# Patient Record
Sex: MALE | Race: WHITE | Employment: FULL TIME | URBAN - METROPOLITAN AREA
[De-identification: names, ages, dates, MRNs, and addresses within clinical notes are randomized per-mention and may not be internally consistent; named-entity substitution may affect disease eponyms.]

---

## 2018-07-24 ENCOUNTER — HOSPITAL ENCOUNTER (OUTPATIENT)
Dept: CT IMAGING | Age: 48
Discharge: HOME | End: 2018-07-24
Attending: INTERNAL MEDICINE | Admitting: INTERNAL MEDICINE

## 2018-07-24 DIAGNOSIS — R10.9 ABDOMINAL PAIN, UNSPECIFIED ABDOMINAL LOCATION: ICD-10-CM

## 2018-07-24 LAB
BUN BLDV-MCNC: 12 MG/DL (ref 7–20)
CREAT SERPL-MCNC: 0.6 MG/DL (ref 0.9–1.3)
GFR AFRICAN AMERICAN: >60
GFR NON-AFRICAN AMERICAN: >60

## 2018-07-26 ENCOUNTER — OFFICE VISIT (OUTPATIENT)
Dept: SURGERY | Age: 48
End: 2018-07-26

## 2018-07-26 VITALS
BODY MASS INDEX: 34.79 KG/M2 | DIASTOLIC BLOOD PRESSURE: 72 MMHG | SYSTOLIC BLOOD PRESSURE: 124 MMHG | HEIGHT: 70 IN | WEIGHT: 243 LBS

## 2018-07-26 DIAGNOSIS — M62.08 RECTUS DIASTASIS: Primary | ICD-10-CM

## 2018-07-26 PROCEDURE — 99242 OFF/OP CONSLTJ NEW/EST SF 20: CPT | Performed by: SURGERY

## 2018-07-26 ASSESSMENT — ENCOUNTER SYMPTOMS
RESPIRATORY NEGATIVE: 1
ABDOMINAL DISTENTION: 1
EYES NEGATIVE: 1

## 2018-07-26 NOTE — PROGRESS NOTES
[facility]        PATIENT NAME: Grant Salazar     TODAY'S DATE: 7/26/2018    Reason for Consult:  Hernia    Requesting Physician / PCP: Dr. Theo Culver / Dr. Rosamaria Muñiz:              The patient is a 50 y.o. male who presents with concerns of a hernia. He has had symptoms for the past weeks, has an upper abd bulge, sticks out like a tube while sitting up. No GI Sx's. Associated symptoms are swelling in the upper abd area. The patient has not had prior hernia surgery. Past Medical History:        Diagnosis Date    Diabetes mellitus type 2 in obese ([facility] [address]     Diabetes type 2, uncontrolled ([facility] [address]     Obesity (BMI 30-39. 9)        Past Surgical History:        Procedure Laterality Date    ADENOIDECTOMY      SHOULDER ARTHROPLASTY Right 2006    TONSILLECTOMY         Current Medications:   No current facility-administered medications for this visit. Prior to Admission medications    Medication Sig Start Date End Date Taking? Authorizing Provider   fexofenadine (ALLEGRA) 60 MG tablet Take 60 mg by mouth daily   Yes Historical Provider, MD   metFORMIN ER (FORTAMET) 1000 MG ER tablet Take 1 tablet by mouth daily (with breakfast) 5/19/16   Loraine Macedo MD   meclizine (ANTIVERT) 12.5 MG tablet Take 1 tablet by mouth 3 times daily as needed for Dizziness 5/18/16   Loraine Macedo MD   Lancet Device MISC Use 3 times daily for glucose check. 5/18/16   Loraine Macedo MD   lisinopril (PRINIVIL;ZESTRIL) 10 MG tablet Take 10 mg by mouth daily    Historical Provider, MD        Allergies:  Latex and Eggs or egg-derived products    Social History:    reports that he has never smoked. He has never used smokeless tobacco. He reports that he does not drink alcohol or use drugs.     Family History:    Family History   Problem Relation Age of Onset    Heart Disease Father     Heart Surgery Father         CABG X4       REVIEW OF SYSTEMS:  Review of Systems